# Patient Record
(demographics unavailable — no encounter records)

---

## 2017-02-06 NOTE — EDPHY
H & P


Stated Complaint: diarrhea, +n/-v and BLE weakness w/ lightheadedness x ~2 days


Time Seen by Provider: 02/06/17 11:18





- Personal History


Current Tetanus/Diphtheria Vaccine: Unsure


Current Tetanus Diphtheria and Acellular Pertussis (TDAP): Unsure





- Medical/Surgical History


Hx Asthma: No


Hx Chronic Respiratory Disease: No


Hx Diabetes: No


Hx Cardiac Disease: No


Hx Renal Disease: No


Hx Cirrhosis: No


Hx Alcoholism: No


Hx HIV/AIDS: No


Hx Splenectomy or Spleen Trauma: No


Other PMH: PSH: liver transplant; prostitectomy; T&A; Appy; maxine; abd sx due 

to sepsis;.  PMH: htn





- Social History


Smoking Status: Never smoked


Constitutional: 


 Initial Vital Signs











Temperature (C)  37.2 C   02/06/17 11:14


 


Heart Rate  64   02/06/17 11:14


 


Respiratory Rate  16   02/06/17 11:14


 


Blood Pressure  101/64   02/06/17 11:14


 


O2 Sat (%)  98   02/06/17 11:14








 











O2 Delivery Mode               Room Air














Allergies/Adverse Reactions: 


 





No Known Allergies Allergy (Unverified 02/06/17 11:11)


 








Home Medications: 














 Medication  Instructions  Recorded


 


Amlodipine Besylate  02/06/17


 


Eliquis  02/06/17


 


Lasix  02/06/17


 


Levothyroxine  02/06/17


 


Lisinopril  02/06/17


 


Meloxicam  02/06/17


 


Metoprolol Succinate  02/06/17


 


Propafenone HCl  02/06/17


 


Tacrolimus  02/06/17


 


hydrALAZINE  02/06/17














Medical Decision Making


ED Course/Re-evaluation: 


CHIEF COMPLAINT:  Diarrhea, weakness





HISTORY OF PRESENT ILLNESS:  The patient is an anticoagulated 83 y/o male 

arriving with his family member complaining of diarrhea and weakness since 

Saturday, 2 days ago. He has a history of atrial fibrillation and liver 

transplant. He says he has been a "little bit wobbly walking since falling last 

fall" and has to "use the walls a lot to walk." He saw his PCP on Wednesday and 

she attributed his weakness to dehydration and also referred him to a 

cardiologist. He continues to feel lightheaded and weak and began having 

diarrhea and nausea 2 days ago. He notes his stools are "quite dark" but not 

bloody and denies aspirin or ibuprofen use. He was hypotensive in the 100s 

systolic for EMS. No recent antibiotics. 





REVIEW OF SYSTEMS:  





A 10 point review of systems was performed and is negative with the exception 

of the elements mentioned in the history of present illness.





PHYSICAL EXAM:  





HR, BP 80/39, O2 Sat, RR.  Temp noted


General Appearance:  Alert, well hydrated, appropriate, pale and ill-appearing.


Head:  Atraumatic without scalp tenderness or obvious injury


Eyes:  Pupils equal, round, reactive to light and accommodation, EOMI, no trauma

, no injection.


Ears:  Clear bilaterally, no perforation, normal landmarks


Nose:  Atraumatic, no rhinorrhea, clear.


Throat:  There is no erythema or exudates, no lesions, normal tonsils, mucus 

membranes moist.


Neck:  Supple, 2+ carotid upstroke, nontender, no lymphadenopathy.


Respiratory:  No retractions, no distress, no wheezes, and no accessory muscle 

use.  Lungs are clear to auscultation bilaterally.


Cardiovascular:  Regular rate and rhythm, no murmurs, rubs, or gallops. 

Bilateral carotid, radial, dorsalis pedis, and posterior tibial pulses intact. 

Good capillary refill all extremities.


Gastrointestinal:  Abdomen is soft, nontender, non-distended, no masses, no 

rebound, no guarding, no peritoneal signs. Several abdominal scars from prior 

surgeries. 


Musculoskeletal:  Normal active ROM of all extremities, atraumatic.


Neurological:  Alert, appropriate, and interactive.  The patient has normal 

DTRs and non-focal cranial nerves, motor, sensory, and cerebellar exam. Normal 

straight-leg raise.


Skin:  No rashes, good turgor, no nodules on palpation. 





Past medical history: Sepsis, atrial fibrillation on Eliquis, hypertension


Past surgical history: Liver transplant 13 years ago, abdominal surgeries 

related to sepsis, appendectomy, cholecystectomy


Family history: Noncontributory


Social history: Works out on treadmill and weight machine daily. Family member 

at bedside.





PCP: Dr. Amelia Gallo





DIFFERENTIAL DIAGNOSIS:   The differential diagnosis for the patient's upper GI 

bleeding included but was not limited to ulcer disease, gastritis, Shaunna-

Martino tear, and esophageal varices.





MEDICAL DECISION MAKING:  





This is an 83 y/o male who presents hypotensive and pale complaining of 

progressive weakness over the last week and difficulty walking for several 

months with dark stool onset 2 days ago. He has a history of liver transplant, 

several abdominal surgeries, and is anticoagulated on Eliquis for atrial 

fibrillation. His strength is 5/5 in his lower extremities and he is 

neurovascularly intact. He is hypotensive in the 80s systolic on assessment, 

but alert and appropriate. His symptoms are consistent with an upper GI bleed 

complicated by his anticoagulation. Plan for IV, labs, type&cross for likely 

transfusion. 





Hct 25, creatinine 2, BUN 80. 1g IV TXA and 80mg IV Protonix administered with 

2 units PRBCs ordered. Patient will be admitted to Dr. Simental, hospitalist, 

for anemia secondary to upper GI bleed. 





1157: Consulted with Dr. Beckman GI. He will follow patient during admission. 





- Data Points


Laboratory Results: 


 Laboratory Results





 02/06/17 11:20 





 02/06/17 11:20 





 











  02/06/17 02/06/17





  11:45 11:20


 


WBC    7.82 10^3/uL





    (3.80-9.50) 


 


RBC    2.72 L 10^6/uL





    (4.40-6.38) 


 


Hgb    8.3 L g/dL





    (13.7-17.5) 


 


Hct    25.0 L %





    (40.0-51.0) 


 


MCV    91.9 fL





    (81.5-99.8) 


 


MCH    30.5 pg





    (27.9-34.1) 


 


MCHC    33.2 g/dL





    (32.4-36.7) 


 


RDW    13.2 %





    (11.5-15.2) 


 


Plt Count    210 10^3/uL





    (150-400) 


 


MPV    10.4 fL





    (8.7-11.7) 


 


Neut % (Auto)    62.8 %





    (39.3-74.2) 


 


Lymph % (Auto)    24.2 %





    (15.0-45.0) 


 


Mono % (Auto)    8.8 %





    (4.5-13.0) 


 


Eos % (Auto)    3.2 %





    (0.6-7.6) 


 


Baso % (Auto)    0.5 %





    (0.3-1.7) 


 


Nucleat RBC Rel Count    0.0 %





    (0.0-0.2) 


 


Absolute Neuts (auto)    4.91 10^3/uL





    (1.70-6.50) 


 


Absolute Lymphs (auto)    1.89 10^3/uL





    (1.00-3.00) 


 


Absolute Monos (auto)    0.69 10^3/uL





    (0.30-0.80) 


 


Absolute Eos (auto)    0.25 10^3/uL





    (0.03-0.40) 


 


Absolute Basos (auto)    0.04 10^3/uL





    (0.02-0.10) 


 


Absolute Nucleated RBC    0.00 10^3/uL





    (0-0.01) 


 


Immature Gran %    0.5 %





    (0.0-1.1) 


 


Immature Gran #    0.04 10^3/uL





    (0.00-0.10) 


 


Sodium    139 mEq/L





    (134-144) 


 


Potassium    5.2 mEq/L





    (3.5-5.2) 


 


Chloride    110 mEq/L





    () 


 


Carbon Dioxide    22 mEq/l





    (22-31) 


 


Anion Gap    7 mEq/L





    (8-16) 


 


BUN    82 H mg/dL





    (7-23) 


 


Creatinine    2.0 H mg/dL





    (0.7-1.3) 


 


Estimated GFR    32 





   


 


Glucose    141 H mg/dL





    () 


 


Calcium    8.7 mg/dL





    (8.5-10.4) 


 


Total Bilirubin    0.5 mg/dL





    (0.1-1.4) 


 


AST    24 IU/L





    (17-59) 


 


ALT    34 IU/L





    (21-72) 


 


Alkaline Phosphatase    147 H IU/L





    () 


 


Total Protein    5.1 L g/dL





    (6.3-8.2) 


 


Albumin    2.5 L g/dL





    (3.5-5.0) 


 


Patient ABO/Rh  Pending   





   


 


Antibody Screen  Pending   





   


 


Crossmatch IS Only  See Detail   





   














Departure





- Departure


Disposition: [facility] Inpatient Acute


Clinical Impression: 


 Anemia due to GI blood loss, Upper GI bleed, Weakness


Condition: Fair


Referrals: 


Patient,NotPresent [Unknown] - As per Instructions


Report Scribed for: Michele Goodman


Report Scribed by: Kendra Calvert


Date of Report: 02/06/17


Time of Report: 11:32

## 2017-02-06 NOTE — GCON
[f 
rep st]



                                                                    CONSULTATION





REFERRING PHYSICIAN:  Michele Goodman MD



CHIEF COMPLAINT:  An 82-year-old gentleman with melenic stool and anemia.



HISTORY OF PRESENT ILLNESS:  This 82-year-old gentleman has a history of 
anticoagulation for atrial fibrillation.  He is status post liver transplant 
for PSC about 13 years ago.  Underwent liver transplant at [facility].  He received a living related donor liver from his son.  He has 
recently moved to [place] from [place].  Previously to that he had lived in 
[place].  He had been feeling unwell over the weekend, had been noticing 
melenic stool, was a little lightheaded and weak.  Had some nausea about 2 days 
ago with some episodes of diarrhea.  Had several stools that were quite dark 
stool with some blood.  He has no NSAID use, but he does take Eliquis for 
chronic atrial fibrillation.  As mentioned, has a prior history of liver 
transplant about 13 years ago.  This was for PSC.  He has no history of 
inflammatory bowel disease.  The patient's blood pressure was 107/60 in the 
emergency department with a heart rate of 61.  His hematocrit was 25 with a BUN 
of 82 with a creatinine of 2.0.  Asked to see patient for further evaluation.  

He has had previous colonoscopy.  He has had a reported history of colon 
polyps.  He cannot recall when his last colonoscopy was.  He has also had 
previous upper endoscopy in the past, complications of this PSC with a history 
of reported esophageal varices.



PAST MEDICAL HISTORY:  Remarkable for liver transplant for PSC, prostatectomy, 
tonsillectomy, adenoidectomy, appendectomy, cholecystectomy, hypertension, 
atrial fibrillation.



ALLERGIES:  No known drug allergies.



MEDICATIONS:  Prior to admission include tacrolimus, meloxicam, lisinopril, 
levothyroxine, Lasix, hydralazine, Eliquis and propafenone.



FAMILY HISTORY:  Negative as it pertains to chief complaint.



SOCIAL HISTORY:  He is .  Recently moved to [place] to be close to his 
son.  He lives in [facility].  His wife has  of Alzheimer this past year.



REVIEW OF SYSTEMS:  Negative for 10 systems in HPI.



PHYSICAL EXAM:  VITAL SIGNS:  107/60, heart rate 61, respiratory rate 18, 98% 
sat.  GENERAL:  Pleasant gentleman, no acute distress.  HEENT:  Normocephalic, 
atraumatic.  EOMI.  NECK:  Supple.  LUNGS:  Clear.  CARDIAC EXAM:  Normal S1, 
S2 without murmur.  ABDOMEN:  Benign.  No hepatosplenomegaly.  Scar on abdomen.
  EXTREMITIES:  Unremarkable without clubbing, cyanosis, or edema.  SKIN:  Warm 
and dry without rashes.  NEURO:  Nonfocal.  PSYCH:  Alert and oriented x3 with 
normal affect.



LABORATORY DATA:  Serum sodium 139, potassium 5.2, BUN of 82, creatinine 2.0, 
blood sugar 141, total bilirubin 0.5, AST 24, ALT of 34, alkaline phosphatase 
147, hemoglobin 8.3



IMPRESSION:  An 82-year-old gentleman reportedly on meloxicam.  He is on 
anticoagulation with Eliquis.  Patient with acute gastrointestinal bleed.  His 
last dose of Eliquis was last evening.  He reports he was not able take his 
medications this morning.  Currently, he is hemodynamically stable, as he has 
had a significant drop in his hematocrit. Rule out NSAID induced ulceration in 
the setting of anti-coagulation. 



RECOMMENDATIONS:  

1.  Admit to the hospital on telemetry.

2.  Serial H and H, transfuse 2 units of packed red blood cells, proceed with 
urgent endoscopy. 

Will follow with you.





Job #:  [id number]


MTDD

## 2017-02-06 NOTE — GHP
[f rep st]



                                                            HISTORY AND PHYSICAL





DATE OF ADMISSION:  2017



DATE OF EVALUATION:  2017



CHIEF COMPLAINT:  Falling at home.



HISTORY:  This is an 82-year-old man with a past medical history that includes PSC, status post liver
 transplant 13 years ago, as well as prostate cancer, who recently moved to [place] about in the las
t month and notes that he has been having issues with increasing falls at home.  He notes that he jus
t has felt unsteady.  He has denied any syncope or near-syncope.  He attributes this to weakness that
 he has had ever since a fall he had in the fall.  He was seen by his primary care physician for thes
e symptoms, and she felt he was likely dehydrated.  He otherwise denies any other issues.  He denies 
any dark or tarry stools.  He denies any hematemesis.  He denies any blood in his urine.



PAST MEDICAL HISTORY:  Includes: 

1.  PSC.

2.  Heart murmur that he was told he needs surgery for, but has not had further workup on.  

3.  Sepsis.

4.  Prostate cancer. 

5.  Atrial fibrillation. 

6.  Hypertension. 

7.  Hypothyroidism.



PAST SURGICAL HISTORY:  Includes:  

1.  Liver transplant.

2.  Prostatectomy.

3.  Appendectomy.  

4.  Cholecystectomy.



FAMILY HISTORY:  This was reviewed and is not contributory.  His parents are both .



SOCIAL HISTORY:  Patient states he works out every day for at least 30-45 minutes.  He recently moved
 here from [place] to be near his son, who is a [employer] professor.  He is a never smoker, never drinker,
 never drug user.  He is a retired .



REVIEW OF SYSTEMS:  A 10-point review of systems obtained and negative, except as per HPI.



CODE STATUS:  This was reviewed with the patient.  He is quite clear that he would like to be a Do No
t Resuscitate.



HOME MEDICATIONS:  Include: 

1.  Propafenone.

2.  Fludrocortisone.

3.  Cyanocobalamin.

4.  Metoprolol.

5.  Meloxicam.

6.  Lisinopril.

7.  Levothyroxine.

8.  Hydralazine.

9.  Lasix.

10.  Tacrolimus.

11.  Amlodipine.

12.  Eliquis.



ALLERGIES:  No known drug allergies.



PHYSICAL EXAMINATION:  VITAL SIGNS:  BP 93/51, heart rate 55, respiratory rate 17, O2 sats 97% on melissa
m air, temperature is 36.4.  GENERAL APPEARANCE:  This is a well-developed/well-nourished elderly man
.  He is awake and alert.  He is in no acute distress.  EYES:  Anicteric.  HENT:  Oropharynx clear.  
CARDIOVASCULAR:  Distant heart sounds, difficult to ascertain whether or not a murmur is present but 
sounds as if he has both a gallop and likely an underlying systolic murmur.  PULMONARY:  CTA bilatera
lly, normal breathing.  ABDOMEN:  Soft, nontender, nondistended.  EXTREMITIES:  No clubbing, cyanosis
, or edema.  SKIN:  Warm, dry, well perfused.  NEURO/PSYCH:  Oriented and appropriate.



CLINICAL DATA:  Labs reviewed, significant for white blood cell count of 7.8, hematocrit of 25, plate
lets of 210.  INR is 1.3.  Chemistries notable for a BUN of 82, creatinine of 2.0 (was 1.7 a week ago
).  LFTs remarkable for an alk phos of 147, total protein 5.2, and albumin 2.5. 



EKG reviewed and interpreted independently by me shows sinus rhythm, 1st-degree AV block.  There is l
eft ventricular hypertrophy with secondary repolarization abnormality.



ASSESSMENT/PLAN:  This is an 82-year-old man complaining of multiple falls at home in the setting of 
anemia, acute kidney injury versus chronic kidney disease, and hypotension.  



Problems:  

1.  Anemia.  This is of uncertain chronicity as we do not have a baseline H and H in the system.  Pat
kitty denies any signs or symptoms of active bleeding.  Will obtain a Hemoccult, transfuse packed red 
blood cells, and trend his H and H.  The patient is fairly reluctant to undergo any invasive interven
tions including endoscopy, but would consider it if it was felt to be necessary.  Again, will trend a
nd make this determination in the coming 24 hours.  We will hold his Eliquis for now in case this mendez
s represent an acute bleed.

2.  Hypotension, in the setting of being on multiple blood pressure medications at home, including hi
gh doses of hydralazine at 100 mg t.i.d., amlodipine 10 daily, metoprolol 50 b.i.d., as well as Rythm
ol 225 b.i.d.  He is likewise bradycardic.  Suspect this is likely contributing to his recurrent fall
s.  Will hold his blood pressure medications for now given that his systolic blood pressure has been 
in the 80s to low 100s since arrival.  Will also hold both propafenone and metoprolol, given his hear
t rate being in the 50s.  He likely will need one or both of these medications resumed at a lower dos
e, but this remains to be seen at this time.  

3.  Acute kidney injury versus chronic kidney disease.  Unknown baseline creatinine, but currently in
creased to 2.0, which was 1.7 several days ago when he was thought to be hypovolemic.  Again, will ho
ld his blood pressure medications, as I suspect this is acute kidney injury that is likely hemodynami
deb mediated.  He is also on lisinopril and Lasix as an outpatient, which could be contributing to 
his worsening renal function.  These also will both be held.  He will be volume resuscitated.

4.  Atrial fibrillation as per above.  EKG showing sinus rhythm at this time.  Again, rate in the 50s
.  

5.  Heart murmur.  Patient states that he has known he has a heart murmur and was previously told ani
t he needs surgery for this.  His heart sounds are quite distant and difficult to auscultate, and we 
will obtain an echocardiogram for further evaluation; though, again, patient does seem reluctant to u
ndergo anything invasive.  

6.  History of liver transplant.  This is secondary to primary sclerosing cholangitis.  He is chronic
ally immunosuppressed secondary to same.  We will continue his immune suppressants and monitor.  It s
ounds as if he has done quite well for the last 13 years.

7.  Code status is DNR.  

8.  Disposition:  Inpatient status.  Patient has multiple active presenting issues, which will necess
itate greater than 48-hour stay and complex medical decision making. 



Patient is new to my care.  ER record reviewed, and care plan reviewed with the ER physician.  Will a
ttempt to obtain records from patient's prior physicians in [place].





Job #:  [id number]

## 2017-02-06 NOTE — CPEKG
Heart Rate: 57

RR Interval: 1053

P-R Interval: 236

QRSD Interval: 84

QT Interval: 444

QTC Interval: 433

P Axis: 73

QRS Axis: 45

T Wave Axis: -85

EKG Severity - ABNORMAL ECG -

EKG Impression: SINUS RHYTHM

EKG Impression: FIRST DEGREE AV BLOCK

EKG Impression: LVH WITH SECONDARY REPOLARIZATION ABNORMALITY

Electronically Signed By: Michele Goodman 06-Feb-2017 14:31:23

## 2017-02-06 NOTE — CPEKG
Heart Rate: 103

RR Interval: 583

QRSD Interval: 84

QT Interval: 360

QTC Interval: 471

QRS Axis: 29

T Wave Axis: 252

EKG Severity - ABNORMAL ECG -

EKG Impression: ATRIAL FIBRILLATION

EKG Impression: LVH WITH SECONDARY REPOLARIZATION ABNORMALITY

EKG Impression: ST DEPRESSION, CONSIDER ISCHEMIA, ANT-LAT LDS

Electronically Signed By: Charly Jasso 07-Feb-2017 13:11:04

## 2017-02-07 NOTE — GPN
[f rep st]



                                                                 PROCEDURE NOTE





PROCEDURE:  Esophagogastroduodenoscopy with biopsy and controlled hemorrhage.



PREOPERATIVE DIAGNOSIS:  Gastrointestinal bleed.



POSTOPERATIVE DIAGNOSIS:  Large deep duodenal ulcer with pigmentation and overlying clot status post 
injection with epinephrine.  Also status post biopsy of antrum for Helicobacter pylori.



INDICATION:  An 82-year-old gentleman with history of significant pulmonary disease.  Patient with pr
ior history of liver transplant about 13 years ago for PSC.  Patient received a living related donor 
transplant from his son.  He has a history of atrial fib and has been started on anticoagulation rece
ntly.  He also takes meloxicam on a regular basis.  He presented to the emergency department with fee
ling lightheaded, dizzy, drop in his hematocrit with episodes of melenic stool.  Presents now for urg
ent endoscopy.



PHYSICAL EXAM:  VITAL SIGNS:  Stable.  LUNGS:  Clear.  CARDIAC:  Normal S1, S2 without murmur.



PERMIT:  Procedure was explained to the patient.  Risks and benefits of the procedure were outlined t
o the patient.  Informed consent was obtained.



PREOPERATIVE MEDICATIONS:  Per Anesthesia.



FINDINGS OF PROCEDURE:  Patient placed in left lateral decubitus position.  The GIF-180 video scope w
as passed through the oropharynx under visualization.  The proximal esophagus was marked with mild er
osive esophagitis at the GE junction at 40 cm.  Endoscope was passed through the stomach.  There was 
no blood within the stomach.  Antrum, body appeared normal.  Endoscope was passed to the pylorus and 
the first and second portion of the duodenum.  Second portion of duodenum was normal.  The first port
ion of duodenum was an enlarged, crater duodenal ulcer about 1 cm to 1.5 cm in size.  Overlying clot 
and pigmentation.  No active bleeding.  The clot was vigorously washed several times and was not disl
odged.  There was some slight narrowing at the duodenum between the first and second portion of the d
uodenum.  Ulcer was treated with epinephrine 1:10,000.  A total of 5 cc.  There was no active bleedin
g at that beginning or end of procedure.  The scope was brought back in the stomach.  Retroflex view 
of the stomach revealed a normal fundus and cardia.  Endoscope was un-retroflexed.  Biopsies were jagdish
en of the antrum and body for H pylori.  Endoscope was then withdrawn.



IMPRESSION:  

1.  Mild erosive esophagitis.

2.  Status post biopsy of the antrum body for Helicobacter pylori.

3.  Deep cratered ulcer at the duodenum with pigmentation of overlying clot.  Patient is a high risk 
for recurrent bleeding.



RECOMMENDATIONS:  

1.  Observe in the hospital overnight.  Continue on IV Protonix drip 8 mg/hour.

2.  Serial hemoglobin and hematocrit .  If any acute signs of GI bleeding may consider repeat endosco
py or Interventional Radiology intervention for embolization.  Will follow with you.

3.  Will discontinue meloxicam.





Job #:  [id number]

## 2017-02-07 NOTE — ECHO
[id number]

[id number]



+---------------------------------------------------+      [address]

:                                                   :       [place] [address]

:                                                   :           [phone]

+---------------------------------------------------+       Fax [phone]



                       Adult Echocardiographic Report



+----------------------------------------------------------------------------+

:Name: SILVIANO VILLALBAAlen Date: 2017 07:55 AM                             :

:MRN: [id number] Hospital Admission Number: [id number] Location: 245:

:: 1935 Gender: Male                           Height: 69 in        :

:Age: 82 yrs     Race: WH                               Weight: 151 lb       :

:Reason For Study: Eval LV Fx                                                :

:                                                       BSA: 1.8 meters2     :

:History: New onset A-fib, Murmur, Anemia                                    :

+----------------------------------------------------------------------------+

MMode/2D Measurements & Calculations

IVSd: 1.5 cm        LVIDd: 4.1 cm FS: 34.9 %          Ao root diam: 3.8 cm

LVPWd: 1.4 cm       LVIDs: 2.6 cm EDV(Teich): 72.9 ml ACS: 0.77 cm

                                  ESV(Teich): 25.7 ml

                                  EF(Teich): 64.7 %

        _____________________________________________________________



LVOT diam: 2.3 cm

LVOT area: 4.2 cm2



Normal Measurement Values:

+----------------------------------------------------------------------------

----------------------------------+

:LVIDd (3.5-5.7cm)    IVSd (0.6-1.1cm)     LVPWd (0.6-1.1cm)     Aortic Root 

(2.0-3.7cm)Left Atrium (1.5-4.0cm):

:LV Vol(d) (76-115ml) LV Vol(s) (29-48ml)  Ejec Fraction (50-65%)PV Mic (0.6-

1.2m/s)    TV Mic (0.4-1.0m/s)    :

:MV E Mic (0.8-1.0m/s)MV A Mic (0.3-1.0m/s)LVOT Mic (0.7-1.2m/s) Asc Ao Mic (

0.9-1.8m/s)                       :

+----------------------------------------------------------------------------

----------------------------------+

Doppler Measurements & Calculations

MV E max mic:         Ao V2 max:          LV V1 mean PG:      SV(LVOT):

109.1 cm/sec          378.6 cm/sec        2.8 mmHg            98.1 ml

                      Ao max P.3 mmHgLV V1 mean:

                      Ao mean P.3 cm/sec

                      54.6 mmHg           LV V1 VTI: 23.3 cm

                      Ao V2 mean:

                      333.9 cm/sec

                      Ao V2 VTI: 107.0 cm



                      JUAN DIEGO(I,D): 0.92 cm2

        _____________________________________________________________



PA V2 max:            TR max mic:

109.9 cm/sec          319.7 cm/sec

PA max P.8 mmHg   TR max P.9 mmHg

                      RAP systole:

                      5.0 mmHg

                      RVSP(TR): 45.9 mmHg



Left Ventricle

The left ventricle is normal in size. There is moderate concentric left

ventricular hypertrophy. The left ventricular ejection fraction is normal.

Ejection Fraction = 65%. The left ventricle is hyperdynamic. The left

ventricular wall motion is normal.





Right Ventricle

The right ventricle is normal in size and function.



Atria

The Left Atrial Volume is 49 ml/m2. The left atrium is severely dilated. The

right atrium is mild to moderately dilated.



Mitral Valve

The mitral valve is normal. There is no evidence of mitral valve prolapse.

There is no mitral valve stenosis. There is trace to mild mitral

regurgitation.



Tricuspid Valve

There is moderate tricuspid regurgitation. Right ventricular systolic

pressure is 47mmHg. There is Doppler evidence for moderate pulmonary

hypertension.





Aortic Valve

Severe Aortic Valve Calcification. The Ao V2 max is 3.8 m/sec with a Ao mean

PG of 54 mmHg. Severe valvular aortic stenosis.



Pulmonic Valve

The pulmonic valve is normal in structure and function.



Great Vessels

The aortic root is normal size.



Pericardium/Pleural

There is no pericardial effusion.



Conclusion

A complete two-dimensional transthoracic echocardiogram was performed (2D,

M-mode, Doppler and color flow Doppler).

There is moderate concentric left ventricular hypertrophy.

The left ventricular ejection fraction is normal.

Ejection Fraction = 65%.

The left ventricle is hyperdynamic.

The left ventricular wall motion is normal.

The right ventricle is normal in size and function.

The Left Atrial Volume is 49 ml/m2.

The left atrium is severely dilated.

The right atrium is mild to moderately dilated.

The mitral valve is normal.

There is trace to mild mitral regurgitation.

There is moderate tricuspid regurgitation.

Right ventricular systolic pressure is 47mmHg.

There is Doppler evidence for moderate pulmonary hypertension.

Severe Aortic Valve Calcification

The Ao V2 max is 3.8 m/sec with a Ao mean PG of 54 mmHg. This is an average

with the patient in atrial fibrillation.

Severe valvular aortic stenosis.

There is no pericardial effusion.





_____________________________________________________________________________





Final Reading Physician:

                        Sona Bird signed on 2017 09:28 AM

Ordering Physician: Juan Simental



Performed By: Ford Olmstead, ILACS

## 2017-02-07 NOTE — SOAPPROG
SOAP Progress Note


Assessment/Plan: 


Assessment:





DU, received another unit of PRBC's last night. No signs or rebleeding today.  

Tolerated PO. 











Plan:





1. Advance diet as tolerated





2. Continue on IV PPI until am then if stable switch to PO pantoprazole 40 mg 

BID





3. Continue to monitor H and H serially, q 12 hours








02/07/17 14:36





Subjective: 


CC: GI Bleed





S/p EGD with injection of epi. Large DU with clot.





Hemodynamically stable without signs of rebleeding.


Objective: 





 Vital Signs











Temp Pulse Resp BP Pulse Ox


 


 36.5 C   92   20   94/55 L  96 


 


 02/07/17 12:11  02/07/17 12:11  02/07/17 12:11  02/07/17 12:11  02/07/17 12:11








 Laboratory Results





 02/07/17 08:10 





 02/07/17 08:00 





 











 02/06/17 02/07/17 02/08/17





 05:59 05:59 05:59


 


Intake Total  7640 


 


Output Total  1050 


 


Balance  6590 








 











PT  16.8 SEC (12.0-15.0)  H  02/06/17  11:20    


 


INR  1.36  (0.83-1.16)  H  02/06/17  11:20    














 











Generic Name Dose Route Start Last Admin





  Trade Name Freq  PRN Reason Stop Dose Admin


 


Acetaminophen  650 mg 02/06/17 15:22  





  Tylenol  PO 08/05/17 15:21  





  Q4HRS PRN   





  Pain, Mild/Fever, Can Take PO   


 


Fludrocortisone Acetate  0.1 mg 02/07/17 09:00 02/07/17 10:06





  Florinef  PO 08/06/17 08:59  0.1 mg





  DAILY AURELIANO   Administration


 


Sodium Chloride  1,000 mls @ 125 mls/hr 02/06/17 15:30 02/07/17 13:53





  Ns  IV 08/05/17 15:29  1,000 mls





  CONT AURELIANO   Administration


 


Levothyroxine Sodium  88 mcg 02/07/17 06:00 02/07/17 05:55





  Synthroid  PO 08/06/17 05:59  88 mcg





  DAILY06 AURELIANO   Administration


 


Ondansetron HCl  4 mg 02/06/17 13:59 02/06/17 13:30





  Zofran  IVP 08/05/17 13:58  4 mg





  Q4HRS PRN   Administration





  Nausea/Vomiting, Can't Take PO   


 


Ondansetron HCl  4 mg 02/06/17 15:22  





  Zofran Odt  PO 08/05/17 15:21  





  Q4HRS PRN   





  Nausea/Vomiting, Use 1st   


 


Oxycodone HCl  5 - 10 mg 02/06/17 15:22  





  Oxycodone Ir  PO 02/16/17 15:21  





  Q3HRS PRN   





  Pain, Severe Able to Take PO   


 


Pantoprazole Sodium  40 mg 02/07/17 13:30 02/07/17 13:52





  Protonix  PO 08/06/17 13:29  40 mg





  BID AURELIANO   Administration


 


Promethazine HCl  6.25 - 12.5 mg 02/06/17 15:22  





  Phenergan Injection  IVP 08/05/17 15:21  





  Q6HRS PRN   





  Nausea/Vomiting, Use 2nd   


 


Tacrolimus  1 mg 02/06/17 17:30 02/07/17 05:55





  Prograf  PO 08/05/17 17:29  1 mg





  Q12H AURELIANO   Administration


 


Trazodone HCl  50 mg 02/06/17 20:57 02/06/17 21:43





  Trazodone  PO 08/05/17 20:56  50 mg





  PRN PRN   Administration





  insomnia   


 


Vitamin B Complex  1,000 mcg 02/07/17 09:00 02/07/17 10:06





  Vitamin B12  PO 08/06/17 08:59  1,000 mcg





  DAILY AURELIANO   Administration














Discontinued Medications














Generic Name Dose Route Start Last Admin





  Trade Name Freq  PRN Reason Stop Dose Admin


 


Amlodipine Besylate  10 mg 02/07/17 09:00  





  Norvasc  PO 08/06/17 08:59  





  DAILY [facility]   


 


Dexamethasone  Confirm 02/06/17 17:17  





  Decadron Injection  Administered 02/06/17 17:18  





  Dose   





  4 mg   





  .ROUTE   





  .STK-MED ONE   


 


Hydralazine HCl  100 mg 02/06/17 22:00  





  Apresoline  PO 08/05/17 21:59  





  TID AURELIANO   


 


Tranexamic Acid 1,000 mg/  110 mls @ 660 mls/hr 02/06/17 11:41 02/06/17 12:22





  Sodium Chloride  IV 02/06/17 11:50  110 mls





  ONCE ONE   Administration


 


Tranexamic Acid 1,000 mg/  510 mls @ 63.75 mls/hr 02/06/17 11:41 02/06/17 12:45





  Sodium Chloride  IV 02/06/17 19:40  510 mls





  ONCE ONE   Administration


 


Pantoprazole Sodium 80 mg/  100 mls @ 10 mls/hr 02/06/17 11:45 02/06/17 12:40





  Sodium Chloride  IV 02/06/17 21:44  100 mls





  EDNOW ONE   Administration


 


Sodium Chloride  500 mls @ 1,500 mls/hr 02/07/17 01:14 02/07/17 01:35





  Ns  IV 02/07/17 01:33  500 mls





  ONCE ONE   Administration


 


Sodium Chloride  500 mls @ 0 mls/hr 02/07/17 03:00 02/07/17 03:06





  Ns  IV 02/07/17 03:01  500 mls





  ONCE ONE   Administration





  As Directed   


 


Metoprolol Tartrate  50 mg 02/06/17 21:00  





  Lopressor  PO 08/05/17 20:59  





  BID AURELIANO   


 


Metoprolol Tartrate  12.5 mg 02/06/17 23:43 02/06/17 23:53





  Lopressor  PO 02/06/17 23:44  12.5 mg





  ONCE ONE   Administration


 


Ondansetron HCl  Confirm 02/06/17 13:05  





  Zofran  Administered 02/06/17 13:06  





  Dose   





  4 mg   





  .ROUTE   





  .STK-MED ONE   


 


Ondansetron HCl  4 mg 02/06/17 15:22  





  Zofran  IVP 08/05/17 15:21  





  Q4HRS PRN   





  Nausea/Vomiting, Can't Take PO   


 


Ondansetron HCl  Confirm 02/06/17 17:02  





  Zofran  Administered 02/06/17 17:03  





  Dose   





  4 mg   





  .ROUTE   





  .STK-MED ONE   


 


Propafenone HCl  225 mg 02/06/17 21:00  





  Rythmol Sr  PO 08/05/17 20:59  





  BID [facility]   


 


Propofol  Confirm 02/06/17 16:06  





  Diprivan 10 Mg/Ml (Premix)  Administered 02/06/17 16:07  





  Dose   





  500 mg   





  IV   





  .STK-MED ONE   














Physical Exam





- Physical Exam


General Appearance: alert, no apparent distress


Respiratory: chest non-tender, lungs clear, normal breath sounds


Cardiac/Chest: regular rate, rhythm


Abdomen: normal bowel sounds, non-tender, soft


Skin: normal color, warm/dry


Neuro/Psych: no motor/sensory deficits, alert, normal mood/affect, oriented x 3





ICD10 Worksheet


Patient Problems: 


 Problems











Problem Status Diagnosed


 


Anemia due to GI blood loss Acute 


 


Upper GI bleed Acute 


 


Weakness Acute

## 2017-02-08 NOTE — PDDCSUM
Discharge Summary


Discharge Summary: 


Dates of service 2/6-2/8/17





Dc diagnosis:


# GI bleed 2/2 duodenal ulcer


# acute blood loss anemia


# htn


# a fib


# recurrent falls





HPI: 83 yo M fairly recently relocated here from [place] presenting with c/o 

recurrent falls and dark stools found to have anemia in setting of acute GI 

bleed while on both nsaids and eliquis. Also noted to be hypotensive on 

multiple BM meds.





# GI bleed: found to be 2/2 duodenal ulcer s/p EGD yesterday. H/h stable. 

Patient had been on AC prior to admission. Will continue PPI, continue to trend 

h/h. GI following, may need repeat scope if evidence of rebleeding. Avoid nsaids

, return precautions given. 


# acute blood loss anemia: in the setting of above, s/p transfusion of 3 units 

and stable since that time


# a fib: has a chads vasc of 3 and was recently started on eliquis in [place] for 

decreasing his stroke risk, reviewed with patient and his son. Rate increased 

off of bb/propafenone which have been resumed. Will have him start aspirin in 2 

weeks so long as no e/o bleeding again but will recommend that he no longer 

take AC. 


# htn: with bp low to low normal since admission off of all meds, on multiple 

meds at home which other than bb/propafenone and low dose lasix. Discussed with 

the patient that he needs to f/u with his PCP to determine if other meds need 

to be added back including amlodipine, lisinopril, hydralazine.


# hx of liver tx: continue tacrolimus, has been doing well


# moderate pulm htn/mod TR: without other significant valvular disease noted on 

echo





Dispo: dc home 


f/u with PCP in the next week, will need f/u cbc and f/u on bp


> 35 min spent in care of this patient more than half in face to face 

counseling regarding f/u care plans and return precautions

## 2017-02-08 NOTE — SOAPPROG
SOAP Progress Note


Assessment/Plan: 


Assessment:





DU, stable. Tolerating PO.  No signs of rebleeding








Plan:








1. Pantoprazole 40 mg BID x 2 weeks then 40 mg daily indefinitely





2. If continues to be stable ok for discharge later today








02/08/17 10:42





Subjective: 


CC:  DU, GI Bleed








Patient without complaint.  Anxious to go home


Objective: 





 Vital Signs











Temp Pulse Resp BP Pulse Ox


 


 36.7 C   87   16   133/84 H  94 


 


 02/08/17 07:47  02/08/17 07:47  02/08/17 07:47  02/08/17 07:47  02/08/17 07:47








 Laboratory Results





 02/08/17 05:31 





 02/08/17 05:31 





 











 02/07/17 02/08/17 02/09/17





 05:59 05:59 05:59


 


Intake Total 7640 1250 


 


Output Total 1050 1925 200


 


Balance 6590 -675 -200








 











PT  16.8 SEC (12.0-15.0)  H  02/06/17  11:20    


 


INR  1.36  (0.83-1.16)  H  02/06/17  11:20    














 











Generic Name Dose Route Start Last Admin





  Trade Name Freq  PRN Reason Stop Dose Admin


 


Acetaminophen  650 mg 02/06/17 15:22  





  Tylenol  PO 08/05/17 15:21  





  Q4HRS PRN   





  Pain, Mild/Fever, Can Take PO   


 


Bisacodyl  10 mg 02/07/17 18:32  





  Dulcolax Rectal  IL 08/06/17 18:31  





  DAILY PRN   





  Constipation   





  Protocol   


 


Fludrocortisone Acetate  0.1 mg 02/07/17 09:00 02/08/17 09:09





  Florinef  PO 08/06/17 08:59  0.1 mg





  DAILY AURELIANO   Administration


 


Lactulose  20 gm 02/07/17 18:32  





  Cephulac  PO 08/06/17 18:31  





  TID PRN   





  Constipation   





  Protocol   


 


Levothyroxine Sodium  88 mcg 02/07/17 06:00 02/08/17 05:23





  Synthroid  PO 08/06/17 05:59  88 mcg





  DAILY06 AURELIANO   Administration


 


Magnesium Hydroxide  30 ml 02/07/17 18:32  





  Milk Of Magnesia  PO 08/06/17 18:31  





  DAILY PRN   





  Constipation   





  Protocol   


 


Ondansetron HCl  4 mg 02/06/17 13:59 02/06/17 13:30





  Zofran  IVP 08/05/17 13:58  4 mg





  Q4HRS PRN   Administration





  Nausea/Vomiting, Can't Take PO   


 


Ondansetron HCl  4 mg 02/06/17 15:22  





  Zofran Odt  PO 08/05/17 15:21  





  Q4HRS PRN   





  Nausea/Vomiting, Use 1st   


 


Oxycodone HCl  5 - 10 mg 02/06/17 15:22  





  Oxycodone Ir  PO 02/16/17 15:21  





  Q3HRS PRN   





  Pain, Severe Able to Take PO   


 


Pantoprazole Sodium  40 mg 02/07/17 13:30 02/08/17 09:09





  Protonix  PO 08/06/17 13:29  40 mg





  BID AURELIANO   Administration


 


Polyethylene Glycol  17 gm 02/07/17 18:32 02/08/17 09:10





  Miralax  PO 08/06/17 18:31  17 gm





  DAILY PRN   Administration





  Constipation, patient prefers   





  Protocol   


 


Promethazine HCl  6.25 - 12.5 mg 02/06/17 15:22  





  Phenergan Injection  IVP 08/05/17 15:21  





  Q6HRS PRN   





  Nausea/Vomiting, Use 2nd   


 


Senna/Docusate Sodium  1 - 2 tab 02/07/17 21:00 02/08/17 09:09





  Senokot-S  PO 08/06/17 20:59  2 tab





  BID AURELIANO   Administration





  Protocol   


 


Tacrolimus  1 mg 02/06/17 17:30 02/08/17 05:23





  Prograf  PO 08/05/17 17:29  1 mg





  Q12H AURELIANO   Administration


 


Trazodone HCl  50 mg 02/06/17 20:57 02/06/17 21:43





  Trazodone  PO 08/05/17 20:56  50 mg





  PRN PRN   Administration





  insomnia   


 


Vitamin B Complex  1,000 mcg 02/07/17 09:00 02/08/17 09:08





  Vitamin B12  PO 08/06/17 08:59  1,000 mcg





  DAILY AURELIANO   Administration














Discontinued Medications














Generic Name Dose Route Start Last Admin





  Trade Name Freq  PRN Reason Stop Dose Admin


 


Amlodipine Besylate  10 mg 02/07/17 09:00  





  Norvasc  PO 08/06/17 08:59  





  DAILY AURELIANO   


 


Dexamethasone  Confirm 02/06/17 17:17  





  Decadron Injection  Administered 02/06/17 17:18  





  Dose   





  4 mg   





  .ROUTE   





  .STK-MED ONE   


 


Hydralazine HCl  100 mg 02/06/17 22:00  





  Apresoline  PO 08/05/17 21:59  





  TID AURELIANO   


 


Tranexamic Acid 1,000 mg/  110 mls @ 660 mls/hr 02/06/17 11:41 02/06/17 12:22





  Sodium Chloride  IV 02/06/17 11:50  110 mls





  ONCE ONE   Administration


 


Tranexamic Acid 1,000 mg/  510 mls @ 63.75 mls/hr 02/06/17 11:41 02/06/17 12:45





  Sodium Chloride  IV 02/06/17 19:40  510 mls





  ONCE ONE   Administration


 


Pantoprazole Sodium 80 mg/  100 mls @ 10 mls/hr 02/06/17 11:45 02/06/17 12:40





  Sodium Chloride  IV 02/06/17 21:44  100 mls





  EDNOW ONE   Administration


 


Sodium Chloride  1,000 mls @ 125 mls/hr 02/06/17 15:30 02/07/17 13:53





  Ns  IV 08/05/17 15:29  1,000 mls





  CONT AURELIANO   Administration


 


Sodium Chloride  500 mls @ 1,500 mls/hr 02/07/17 01:14 02/07/17 01:35





  Ns  IV 02/07/17 01:33  500 mls





  ONCE ONE   Administration


 


Sodium Chloride  500 mls @ 0 mls/hr 02/07/17 03:00 02/07/17 03:06





  Ns  IV 02/07/17 03:01  500 mls





  ONCE ONE   Administration





  As Directed   


 


Metoprolol Tartrate  50 mg 02/06/17 21:00  





  Lopressor  PO 08/05/17 20:59  





  BID AURELIANO   


 


Metoprolol Tartrate  12.5 mg 02/06/17 23:43 02/06/17 23:53





  Lopressor  PO 02/06/17 23:44  12.5 mg





  ONCE ONE   Administration


 


Ondansetron HCl  Confirm 02/06/17 13:05  





  Zofran  Administered 02/06/17 13:06  





  Dose   





  4 mg   





  .ROUTE   





  .STK-MED ONE   


 


Ondansetron HCl  4 mg 02/06/17 15:22  





  Zofran  IVP 08/05/17 15:21  





  Q4HRS PRN   





  Nausea/Vomiting, Can't Take PO   


 


Ondansetron HCl  Confirm 02/06/17 17:02  





  Zofran  Administered 02/06/17 17:03  





  Dose   





  4 mg   





  .ROUTE   





  .STK-MED ONE   


 


Propafenone HCl  225 mg 02/06/17 21:00  





  Rythmol Sr  PO 08/05/17 20:59  





  BID AURELIANO   


 


Propofol  Confirm 02/06/17 16:06  





  Diprivan 10 Mg/Ml (Premix)  Administered 02/06/17 16:07  





  Dose   





  500 mg   





  IV   





  .STK-MED ONE   














Physical Exam





- Physical Exam


General Appearance: alert, no apparent distress


Respiratory: lungs clear, normal breath sounds


Cardiac/Chest: irregularly irregular


Abdomen: normal bowel sounds, non-tender


Skin: normal color, warm/dry


Neuro/Psych: no motor/sensory deficits, alert, normal mood/affect





ICD10 Worksheet


Patient Problems: 


 Problems











Problem Status Diagnosed


 


Anemia due to GI blood loss Acute 


 


Upper GI bleed Acute 


 


Weakness Acute

## 2022-10-13 NOTE — HOSPPROG
Hospitalist Progress Note


Assessment/Plan: 


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 

bleed 2/2 duodenal ulcer





# GI bleed: found to be 2/2 duodenal ulcer s/p EGD yesterday. H/h stable so far 

overnight. Patient had been on AC prior to admission. Will continue PPI, 

continue to trend h/h. GI following, may need repeat scope if evidence of 

rebleeding


# acute blood loss anemia: in the setting of above, s/p transfusion of 3 units 

and has been stable so far overnight (one draw that was low was likely lab error

)


# a fib: has a chads vasc of 3 and was recently started on eliquis in [place] for 

decreasing his stroke risk, reviewed with patient and his son. Now with a 

potentially life threatening bleed as well as hx of falling, given that his 

annual stroke risk is 4%, risk likely outweighs benefit and will change patient 

back to asa for stroke ppx. He will f/u with his pcp and cardiology to be sure


# hx of liver tx: continue tacrolimus, has been doing well


# moderate pulm htn/mod TR: without other significant valvular disease noted on 

echo, patient thought he was told in the past that he had valvular disease 

warranting surgery, seems unlikely based on echo report. 


# dispo: IP status, high risk necessitating ongoing monitoring of h/h and 

possible repeat egd


> 35 min spent in direct care of this patient, more than half in face to face 

counseling of patient and his son present at bedside. 


Subjective: no significant overnight events, patient feeling well and eager to 

go home, very hungry, did have one dark stool


Objective: 


 Vital Signs











Temp Pulse Resp BP Pulse Ox


 


 36.5 C   92   20   94/55 L  96 


 


 02/07/17 12:11  02/07/17 12:11  02/07/17 12:11  02/07/17 12:11  02/07/17 12:11








 Laboratory Results





 02/07/17 08:10 





 02/07/17 08:00 





 











 02/06/17 02/07/17 02/08/17





 05:59 05:59 05:59


 


Intake Total  7640 


 


Output Total  1050 


 


Balance  6590 








 











PT  16.8 SEC (12.0-15.0)  H  02/06/17  11:20    


 


INR  1.36  (0.83-1.16)  H  02/06/17  11:20    








awake alert nad


anicteric


op clear 


rrr no mrg


cta b 


soft nt nd


no cce


warm dry well perfused





ICD10 Worksheet


Patient Problems: 


 Problems











Problem Status Diagnosed


 


Anemia due to GI blood loss Acute 


 


Upper GI bleed Acute 


 


Weakness Acute Quality 110: Preventive Care And Screening: Influenza Immunization: Influenza Immunization Administered during Influenza season Quality 226: Preventive Care And Screening: Tobacco Use: Screening And Cessation Intervention: Patient screened for tobacco use and is an ex/non-smoker Detail Level: Detailed Quality 130: Documentation Of Current Medications In The Medical Record: Current Medications Documented